# Patient Record
Sex: MALE | Race: WHITE | NOT HISPANIC OR LATINO | Employment: FULL TIME | URBAN - METROPOLITAN AREA
[De-identification: names, ages, dates, MRNs, and addresses within clinical notes are randomized per-mention and may not be internally consistent; named-entity substitution may affect disease eponyms.]

---

## 2022-01-04 ENCOUNTER — HOSPITAL ENCOUNTER (EMERGENCY)
Facility: HOSPITAL | Age: 57
Discharge: HOME | End: 2022-01-04
Attending: EMERGENCY MEDICINE
Payer: COMMERCIAL

## 2022-01-04 VITALS
RESPIRATION RATE: 19 BRPM | HEART RATE: 77 BPM | SYSTOLIC BLOOD PRESSURE: 132 MMHG | OXYGEN SATURATION: 97 % | BODY MASS INDEX: 27.11 KG/M2 | HEIGHT: 69 IN | TEMPERATURE: 98 F | WEIGHT: 183 LBS | DIASTOLIC BLOOD PRESSURE: 89 MMHG

## 2022-01-04 DIAGNOSIS — R07.9 CHEST PAIN: ICD-10-CM

## 2022-01-04 LAB
ALBUMIN SERPL BCP-MCNC: 4.3 G/DL (ref 3.5–5.2)
ALP SERPL-CCNC: 114 U/L (ref 38–126)
ALT SERPL W/O P-5'-P-CCNC: 28 U/L (ref 10–44)
ANION GAP SERPL CALC-SCNC: 14 MMOL/L (ref 8–16)
AST SERPL-CCNC: 32 U/L (ref 15–46)
BASOPHILS # BLD AUTO: 0.09 K/UL (ref 0–0.2)
BASOPHILS NFR BLD: 0.9 % (ref 0–1.9)
BILIRUB SERPL-MCNC: 0.8 MG/DL (ref 0.1–1)
CALCIUM SERPL-MCNC: 8.3 MG/DL (ref 8.7–10.5)
CHLORIDE SERPL-SCNC: 102 MMOL/L (ref 95–110)
CO2 SERPL-SCNC: 25 MMOL/L (ref 23–29)
CREAT SERPL-MCNC: 0.83 MG/DL (ref 0.5–1.4)
DIFFERENTIAL METHOD: ABNORMAL
EOSINOPHIL # BLD AUTO: 0.2 K/UL (ref 0–0.5)
EOSINOPHIL NFR BLD: 1.8 % (ref 0–8)
ERYTHROCYTE [DISTWIDTH] IN BLOOD BY AUTOMATED COUNT: 12.7 % (ref 11.5–14.5)
EST. GFR  (AFRICAN AMERICAN): >60 ML/MIN/1.73 M^2
EST. GFR  (NON AFRICAN AMERICAN): >60 ML/MIN/1.73 M^2
GLUCOSE SERPL-MCNC: 173 MG/DL (ref 70–110)
HCT VFR BLD AUTO: 46.5 % (ref 40–54)
HGB BLD-MCNC: 16.2 G/DL (ref 14–18)
IMM GRANULOCYTES # BLD AUTO: 0.03 K/UL (ref 0–0.04)
IMM GRANULOCYTES NFR BLD AUTO: 0.3 % (ref 0–0.5)
LYMPHOCYTES # BLD AUTO: 3.5 K/UL (ref 1–4.8)
LYMPHOCYTES NFR BLD: 35 % (ref 18–48)
MCH RBC QN AUTO: 33.1 PG (ref 27–31)
MCHC RBC AUTO-ENTMCNC: 34.8 G/DL (ref 32–36)
MCV RBC AUTO: 95 FL (ref 82–98)
MONOCYTES # BLD AUTO: 0.9 K/UL (ref 0.3–1)
MONOCYTES NFR BLD: 8.6 % (ref 4–15)
NEUTROPHILS # BLD AUTO: 5.3 K/UL (ref 1.8–7.7)
NEUTROPHILS NFR BLD: 53.4 % (ref 38–73)
NRBC BLD-RTO: 0 /100 WBC
PLATELET # BLD AUTO: 211 K/UL (ref 150–450)
PMV BLD AUTO: 9.6 FL (ref 9.2–12.9)
POTASSIUM SERPL-SCNC: 3.5 MMOL/L (ref 3.5–5.1)
PROT SERPL-MCNC: 7.2 G/DL (ref 6–8.4)
RBC # BLD AUTO: 4.9 M/UL (ref 4.6–6.2)
SODIUM SERPL-SCNC: 141 MMOL/L (ref 136–145)
TROPONIN I SERPL-MCNC: <0.012 NG/ML (ref 0.01–0.03)
TROPONIN I SERPL-MCNC: <0.012 NG/ML (ref 0.01–0.03)
UUN UR-MCNC: 10 MG/DL (ref 2–20)
WBC # BLD AUTO: 9.94 K/UL (ref 3.9–12.7)

## 2022-01-04 PROCEDURE — 84484 ASSAY OF TROPONIN QUANT: CPT | Mod: ER | Performed by: EMERGENCY MEDICINE

## 2022-01-04 PROCEDURE — 94760 N-INVAS EAR/PLS OXIMETRY 1: CPT | Mod: ER

## 2022-01-04 PROCEDURE — 84484 ASSAY OF TROPONIN QUANT: CPT | Mod: 91,ER | Performed by: EMERGENCY MEDICINE

## 2022-01-04 PROCEDURE — 93005 ELECTROCARDIOGRAM TRACING: CPT | Mod: ER

## 2022-01-04 PROCEDURE — 80053 COMPREHEN METABOLIC PANEL: CPT | Mod: ER | Performed by: EMERGENCY MEDICINE

## 2022-01-04 PROCEDURE — 25000003 PHARM REV CODE 250: Mod: ER | Performed by: EMERGENCY MEDICINE

## 2022-01-04 PROCEDURE — 99284 EMERGENCY DEPT VISIT MOD MDM: CPT | Mod: 25,ER

## 2022-01-04 PROCEDURE — 93010 EKG 12-LEAD: ICD-10-PCS | Mod: ,,, | Performed by: INTERNAL MEDICINE

## 2022-01-04 PROCEDURE — 93010 ELECTROCARDIOGRAM REPORT: CPT | Mod: ,,, | Performed by: INTERNAL MEDICINE

## 2022-01-04 PROCEDURE — 85025 COMPLETE CBC W/AUTO DIFF WBC: CPT | Mod: ER | Performed by: EMERGENCY MEDICINE

## 2022-01-04 RX ORDER — ASPIRIN 325 MG
325 TABLET ORAL
Status: COMPLETED | OUTPATIENT
Start: 2022-01-04 | End: 2022-01-04

## 2022-01-04 RX ADMIN — ASPIRIN 325 MG ORAL TABLET 325 MG: 325 PILL ORAL at 05:01

## 2022-01-05 NOTE — ED PROVIDER NOTES
Encounter Date: 1/4/2022       History     Chief Complaint   Patient presents with    Chest Pain     Pt c/o Right arm, shoulder and neck pain. Hx of Distal RCA stent     Patient currently presents with chief complaint of chest pain.  This began between 4-430 this afternoon.  This is localized to the mid chest region.  Patient denies shortness of breath, denies palpitations,  denies nausea, denies diaphoresis.  Radiation of pain:  right shoulder.  Patient denies aspirin use in the last 24 hours. Patient reports history of known CAD with stent placed in his distal RCA after MI this past spring.  Cardiac risk factors include:  diabetes, hypertension, a history of coronary artery disease.        Review of patient's allergies indicates:  No Known Allergies  Past Medical History:   Diagnosis Date    Diabetes mellitus     Hypercholesterolemia      Past Surgical History:   Procedure Laterality Date    CORONARY ANGIOPLASTY WITH STENT PLACEMENT  05/2021     No family history on file.     Review of Systems   Constitutional: Negative for chills and fever.   HENT: Negative for congestion and sore throat.    Respiratory: Negative for chest tightness and shortness of breath.    Cardiovascular: Positive for chest pain. Negative for palpitations.   Gastrointestinal: Negative for abdominal pain and vomiting.   Genitourinary: Negative for difficulty urinating and dysuria.   Skin: Negative for color change and rash.   Allergic/Immunologic: Negative for immunocompromised state.   Neurological: Negative for weakness and numbness.   Hematological: Negative for adenopathy.   All other systems reviewed and are negative.      Physical Exam     Initial Vitals [01/04/22 1749]   BP Pulse Resp Temp SpO2   132/81 110 (!) 24 97.3 °F (36.3 °C) 98 %      MAP       --         Vitals:    01/04/22 1749 01/04/22 1911 01/04/22 1913 01/04/22 1915   BP: 132/81      Pulse: 110 91 90 91   Resp: (!) 24 18 19 (!) 21   Temp: 97.3 °F (36.3 °C)      TempSrc:  "Oral      SpO2: 98% 98% 98% 97%   Weight: 83 kg (183 lb)      Height: 5' 9" (1.753 m)       01/04/22 1920 01/04/22 1933 01/04/22 1952 01/04/22 2002   BP: (S) 134/85 132/85  135/88   Pulse: (S) 92 87 85 85   Resp: (S) 19 17 17 18   Temp: (S) 98.7 °F (37.1 °C)      TempSrc: (S) Oral      SpO2: 97% 97% 98% 98%   Weight:       Height:        01/04/22 2102 01/04/22 2104 01/04/22 2105 01/04/22 2151   BP: (S) 129/89      Pulse: (S) 82 83 86 (S) 77   Resp: (S) 19 19 19 (S) 19   Temp: (S) 98.7 °F (37.1 °C) (S) 98.7 °F (37.1 °C)     TempSrc: Oral (S) Oral     SpO2: (S) 96% 97% 98% (S) 97%   Weight:       Height:        01/04/22 2152   BP: (S) 132/89   Pulse:    Resp:    Temp: (S) 98.4 °F (36.9 °C)   TempSrc: (S) Oral   SpO2:    Weight:    Height:          Physical Exam    Constitutional: He appears well-developed and well-nourished. He is not diaphoretic. No distress.   HENT:   Head: Normocephalic and atraumatic.   Nose: Nose normal.   Mouth/Throat: Oropharynx is clear and moist.   Eyes: Conjunctivae are normal. No scleral icterus.   Neck: Neck supple. No JVD present.   Cardiovascular: Normal rate, regular rhythm, normal heart sounds and intact distal pulses.   Pulmonary/Chest: Breath sounds normal. No respiratory distress.   Abdominal: There is no abdominal tenderness.   Musculoskeletal:      Cervical back: Neck supple.     Neurological: He is alert and oriented to person, place, and time. He has normal strength. No cranial nerve deficit.   Skin: Skin is warm and dry.       ED Course   Procedures  Labs Reviewed   CBC W/ AUTO DIFFERENTIAL - Abnormal; Notable for the following components:       Result Value    MCH 33.1 (*)     All other components within normal limits   COMPREHENSIVE METABOLIC PANEL - Abnormal; Notable for the following components:    Glucose 173 (*)     Calcium 8.3 (*)     All other components within normal limits   TROPONIN I   TROPONIN I     EKG Readings: (Independently Interpreted)   Initial Reading: No " STEMI. Rhythm: Sinus Tachycardia. Heart Rate: 120. Ectopy: No Ectopy. Other Impression: suspected remote inferior MI       Imaging Results          X-Ray Chest AP Portable (Final result)  Result time 01/04/22 19:14:13    Final result by Suyapa Fitzpatrick MD (01/04/22 19:14:13)                 Impression:      No acute abnormality.      Electronically signed by: Amari Dale  Date:    01/04/2022  Time:    19:14             Narrative:    EXAMINATION:  XR CHEST AP PORTABLE    CLINICAL HISTORY:  Chest pain;    TECHNIQUE:  Single frontal view of the chest was performed.    COMPARISON:  None    FINDINGS:  The lungs are clear, with normal appearance of pulmonary vasculature and no pleural effusion or pneumothorax.    The cardiac silhouette is normal in size. The hilar and mediastinal contours are unremarkable.    Bones are intact.                                 Medications   aspirin tablet 325 mg (325 mg Oral Given 1/4/22 1745)     Medical Decision Making:   ED Management:  All findings were reviewed with the patient/family in detail along with the diagnosis of chest pain.  Given the patient's elevated risk for MACE based on a carefully determined HEART score and overall clinical judgement, unremarkable cardiac monitoring and troponin levels drawn at the time of arrival and again 5-6 hours post symptom onset have been used to effectively rule out ACS.  Additionally, I have no considerable suspicion for aortic dissection/aneurysm, esophageal perforation, or acute pulmonary complications based on the presentation, exam, lab results, or imaging.    I see no indication of an emergent process beyond that addressed during our encounter but have duly counseled the patient/family regarding the need for prompt outpatient follow-up for further evaluation as well as the indications that should prompt immediate return to the emergency room should new or worrisome developments occur.  The patient has been provided with both verbal and  written instructions following the encounter.  The patient/family communicates understanding of all this information and all remaining questions and concerns were addressed at this time.                        Clinical Impression:   Final diagnoses:  [R07.9] Chest pain          ED Disposition Condition    Discharge Stable        ED Prescriptions     None        Follow-up Information     Follow up With Specialties Details Why Contact Info    PCP  Schedule an appointment as soon as possible for a visit  for reassessment     [facility] Emergency Dept Emergency Medicine Go to  As needed, If symptoms worsen [address] [place] [id number] [phone]           Zoran Martinez MD  01/05/22 0130